# Patient Record
Sex: MALE | Race: WHITE | NOT HISPANIC OR LATINO | URBAN - METROPOLITAN AREA
[De-identification: names, ages, dates, MRNs, and addresses within clinical notes are randomized per-mention and may not be internally consistent; named-entity substitution may affect disease eponyms.]

---

## 2018-06-29 ENCOUNTER — OUTPATIENT (OUTPATIENT)
Dept: OUTPATIENT SERVICES | Facility: HOSPITAL | Age: 58
LOS: 1 days | End: 2018-06-29

## 2020-01-18 ENCOUNTER — APPOINTMENT (OUTPATIENT)
Dept: ULTRASOUND IMAGING | Facility: CLINIC | Age: 60
End: 2020-01-18
Payer: COMMERCIAL

## 2020-01-18 PROCEDURE — 93880 EXTRACRANIAL BILAT STUDY: CPT

## 2022-04-19 PROBLEM — Z00.00 ENCOUNTER FOR PREVENTIVE HEALTH EXAMINATION: Status: ACTIVE | Noted: 2022-04-19

## 2022-04-20 ENCOUNTER — APPOINTMENT (OUTPATIENT)
Dept: NEUROSURGERY | Facility: CLINIC | Age: 62
End: 2022-04-20
Payer: COMMERCIAL

## 2022-04-20 VITALS
WEIGHT: 250 LBS | BODY MASS INDEX: 33.86 KG/M2 | TEMPERATURE: 98 F | HEIGHT: 72 IN | DIASTOLIC BLOOD PRESSURE: 82 MMHG | SYSTOLIC BLOOD PRESSURE: 132 MMHG

## 2022-04-20 DIAGNOSIS — S32.009A UNSPECIFIED FRACTURE OF UNSPECIFIED LUMBAR VERTEBRA, INITIAL ENCOUNTER FOR CLOSED FRACTURE: ICD-10-CM

## 2022-04-20 PROCEDURE — 99203 OFFICE O/P NEW LOW 30 MIN: CPT

## 2022-04-20 RX ORDER — LOSARTAN POTASSIUM AND HYDROCHLOROTHIAZIDE 12.5; 1 MG/1; MG/1
100-12.5 TABLET ORAL
Refills: 0 | Status: ACTIVE | COMMUNITY

## 2022-04-20 RX ORDER — FELODIPINE 5 MG/1
5 TABLET, EXTENDED RELEASE ORAL
Refills: 0 | Status: ACTIVE | COMMUNITY

## 2022-04-20 RX ORDER — METHOCARBAMOL 500 MG/1
500 TABLET, FILM COATED ORAL TWICE DAILY
Qty: 14 | Refills: 0 | Status: ACTIVE | COMMUNITY
Start: 2022-04-20 | End: 1900-01-01

## 2022-04-20 RX ORDER — ROSUVASTATIN CALCIUM 5 MG/1
5 TABLET, FILM COATED ORAL
Refills: 0 | Status: ACTIVE | COMMUNITY

## 2022-04-20 RX ORDER — ATENOLOL 50 MG/1
50 TABLET ORAL
Refills: 0 | Status: ACTIVE | COMMUNITY

## 2022-04-20 RX ORDER — FENOFIBRIC ACID 135 MG/1
135 CAPSULE, DELAYED RELEASE ORAL
Refills: 0 | Status: ACTIVE | COMMUNITY

## 2022-04-20 RX ORDER — NAPROXEN 500 MG/1
500 TABLET ORAL
Qty: 60 | Refills: 1 | Status: ACTIVE | COMMUNITY
Start: 2022-04-20 | End: 1900-01-01

## 2022-04-20 NOTE — HISTORY OF PRESENT ILLNESS
[de-identified] : 61-year-old male presents to the neurosurgery office for an initial office visit with his wife with complaints of low back pain.  The patient reports that his symptoms have been present for about 1 week with no specific mechanism of injury.  The patient works as a  and does auto part deliveries.  He states that he can drive up to 190 miles a day and does a lot of heavy lifting.  Approximately 1 week ago, the patient experienced increased low back pain.  Initially went to a chiropractor but did not get any relief after treatment.  Because of the persistent pain and left lower extremity sciatica symptoms, the patient went to the local urgent care where radiographs were obtained and reviewed of the lumbar spine revealing an L4 compression fracture deformity of the inferior endplates.  He was given a steroid taper, muscle relaxant, and meloxicam which has helped with his current symptoms.  He is currently not working because of his present symptoms.  He has no other complaints at present.

## 2022-04-20 NOTE — DATA REVIEWED
[de-identified] : Were reviewed of the lumbar spine (disc returned to the patient)  -fracture of the inferior endplate of the L4 vertebral body

## 2022-04-20 NOTE — ASSESSMENT
[FreeTextEntry1] : Discussed the history, physical examination findings, and recent imaging with the patient and his wife with all questions answered.  Discussed that further evaluation is warranted with an MRI of the lumbar spine because the patient sciatic complaints to rule out retropulsion of fragments.  Recommend rest and modified activity into the next office visit.  The patient will continue to remain out of work until the next office visit.  LSO brace provided as well as medications (muscle relaxant/NSAIDs).  The patient will follow up after the imaging studies are completed to discuss further treatment recommendations.

## 2022-04-28 DIAGNOSIS — M62.830 MUSCLE SPASM OF BACK: ICD-10-CM

## 2022-04-28 RX ORDER — METHOCARBAMOL 500 MG/1
500 TABLET, FILM COATED ORAL TWICE DAILY
Qty: 14 | Refills: 0 | Status: ACTIVE | COMMUNITY
Start: 2022-04-28 | End: 1900-01-01

## 2022-05-03 ENCOUNTER — APPOINTMENT (OUTPATIENT)
Dept: MRI IMAGING | Facility: CLINIC | Age: 62
End: 2022-05-03
Payer: COMMERCIAL

## 2022-05-03 PROCEDURE — 72148 MRI LUMBAR SPINE W/O DYE: CPT

## 2022-05-05 ENCOUNTER — APPOINTMENT (OUTPATIENT)
Dept: NEUROSURGERY | Facility: CLINIC | Age: 62
End: 2022-05-05
Payer: COMMERCIAL

## 2022-05-05 VITALS
TEMPERATURE: 98.4 F | HEIGHT: 72 IN | SYSTOLIC BLOOD PRESSURE: 140 MMHG | DIASTOLIC BLOOD PRESSURE: 84 MMHG | WEIGHT: 250 LBS | BODY MASS INDEX: 33.86 KG/M2

## 2022-05-05 DIAGNOSIS — Z72.0 TOBACCO USE: ICD-10-CM

## 2022-05-05 DIAGNOSIS — M54.16 RADICULOPATHY, LUMBAR REGION: ICD-10-CM

## 2022-05-05 PROCEDURE — 99213 OFFICE O/P EST LOW 20 MIN: CPT

## 2022-05-05 RX ORDER — NAPROXEN 500 MG/1
500 TABLET ORAL
Qty: 60 | Refills: 1 | Status: ACTIVE | COMMUNITY
Start: 2022-05-05 | End: 1900-01-01

## 2022-05-05 NOTE — DATA REVIEWED
[de-identified] : MRI has been reviewed of the lumbar spine from 5/5/2022: L3-4 lumbar stenosis with posterior synovial cyst.  Multilevel spondylosis. [de-identified] : Were reviewed of the lumbar spine (disc returned to the patient)  -fracture of the inferior endplate of the L4 vertebral body

## 2022-05-05 NOTE — REASON FOR VISIT
[Follow-Up: _____] : a [unfilled] follow-up visit [FreeTextEntry1] : Pt here for MRI review. Pfizer booster on 01/02/22.

## 2022-05-05 NOTE — HISTORY OF PRESENT ILLNESS
[de-identified] : 61-year-old male presents to the neurosurgery office for follow-up evaluation of low back pain/lumbar radiculopathy.  The patient reports that his symptoms have been present for about 1 week with no specific mechanism of injury.  The patient works as a  and does auto part deliveries.  He states that he can drive up to 190 miles a day and does a lot of heavy lifting.  He was given a steroid taper, muscle relaxant, and meloxicam which has helped with his current symptoms.  He is currently not working because of his present symptoms.  He has no other complaints at present.  An MRI was ordered at the last office visit which the patient is here today to review.  He states that his symptoms have improved somewhat with and feels that he can return to work on Monday, 5/9/2022.

## 2022-05-05 NOTE — HISTORY OF PRESENT ILLNESS
[de-identified] : 61-year-old male presents to the neurosurgery office for follow-up evaluation of low back pain/lumbar radiculopathy.  The patient reports that his symptoms have been present for about 1 week with no specific mechanism of injury.  The patient works as a  and does auto part deliveries.  He states that he can drive up to 190 miles a day and does a lot of heavy lifting.  He was given a steroid taper, muscle relaxant, and meloxicam which has helped with his current symptoms.  He is currently not working because of his present symptoms.  He has no other complaints at present.  An MRI was ordered at the last office visit which the patient is here today to review.  He states that his symptoms have improved somewhat with and feels that he can return to work on Monday, 5/9/2022.

## 2022-05-05 NOTE — DATA REVIEWED
[de-identified] : MRI has been reviewed of the lumbar spine from 5/5/2022: L3-4 lumbar stenosis with posterior synovial cyst.  Multilevel spondylosis. [de-identified] : Were reviewed of the lumbar spine (disc returned to the patient)  -fracture of the inferior endplate of the L4 vertebral body

## 2022-05-05 NOTE — ASSESSMENT
[FreeTextEntry1] : Discussed the history, physical examination findings, and recent imaging with the patient and his wife with all questions answered.  The MRI is negative for any acute fracture and no bone marrow edema noted in the imaging.  Lumbar stenosis is seen and most pronounced at L3-4.  Conservative treatment recommended with rest and modified activity along with anti-inflammatory medications.  A note has been provided for the patient to return to work on 5/9/2022.  Physical therapy and pain management referrals given.  Discussed that there is no role for neurosurgical intervention.  The patient will follow up on an as-needed basis.

## 2022-05-18 ENCOUNTER — APPOINTMENT (OUTPATIENT)
Age: 62
End: 2022-05-18
Payer: COMMERCIAL

## 2022-05-18 PROCEDURE — 72110 X-RAY EXAM L-2 SPINE 4/>VWS: CPT

## 2022-06-21 ENCOUNTER — APPOINTMENT (OUTPATIENT)
Dept: NEUROSURGERY | Facility: CLINIC | Age: 62
End: 2022-06-21
Payer: COMMERCIAL

## 2022-06-21 VITALS
BODY MASS INDEX: 33.86 KG/M2 | WEIGHT: 250 LBS | HEART RATE: 60 BPM | TEMPERATURE: 97.7 F | DIASTOLIC BLOOD PRESSURE: 92 MMHG | HEIGHT: 72 IN | SYSTOLIC BLOOD PRESSURE: 172 MMHG

## 2022-06-21 DIAGNOSIS — M48.061 SPINAL STENOSIS, LUMBAR REGION WITHOUT NEUROGENIC CLAUDICATION: ICD-10-CM

## 2022-06-21 DIAGNOSIS — M54.30 DORSALGIA, UNSPECIFIED: ICD-10-CM

## 2022-06-21 DIAGNOSIS — M54.9 DORSALGIA, UNSPECIFIED: ICD-10-CM

## 2022-06-21 PROCEDURE — 99213 OFFICE O/P EST LOW 20 MIN: CPT

## 2022-06-21 NOTE — REASON FOR VISIT
[Follow-Up: _____] : a [unfilled] follow-up visit [FreeTextEntry1] : Pt was referred from Dr. Hughes for a F/U.

## 2022-06-21 NOTE — ASSESSMENT
[FreeTextEntry1] : Discussed the history, physical examination findings, and recent imaging with the patient and his wife with all questions answered.  The MRI is negative for any acute fracture and no bone marrow edema noted in the imaging.  Lumbar stenosis is seen and most pronounced at L3-4.  The patient received an injection from the pain management doctor (Dr. Hughes) and states that he is doing well.  Dynamic spondylolisthesis at L2-3 is noted, however the patient has not affected by it.  Discussed that there is no role for acute neurosurgical intervention at this time.  Continue with pain management.  The patient may follow-up with the neurosurgery office if pain management does not provide good symptom relief.

## 2022-06-21 NOTE — HISTORY OF PRESENT ILLNESS
[de-identified] : 61-year-old male presents to the neurosurgery office for follow-up evaluation of low back pain/lumbar radiculopathy.  The patient reports that his symptoms have been present for about 1 week with no specific mechanism of injury.  The patient works as a  and does auto part deliveries.  He states that he can drive up to 190 miles a day and does a lot of heavy lifting.  He was given a steroid taper, muscle relaxant, and meloxicam which has helped with his current symptoms.  He is currently not working because of his present symptoms.  He has no other complaints at present.  He states that his symptoms have improved somewhat with and feels that he can return to work on Monday, 5/9/2022.  An MRI of the lumbar spine was reviewed at the last office visit showing L3-4 stenosis.  Conservative treatment was recommended with PT and pain management.  The patient went to Dr. Hughes who ordered flexion/extension views.  Dynamic instability was noted at L2-3 and the patient was then directed to follow-up with us for review of the imaging.  The patient states that he has great symptom relief since the injection that he received from Dr. Hughes.  The patient states that he is doing well since the injection and has no new complaints.

## 2022-06-21 NOTE — DATA REVIEWED
[de-identified] : MRI has been reviewed of the lumbar spine from 5/5/2022: L3-4 lumbar stenosis with posterior synovial cyst.  Multilevel spondylosis. [de-identified] : Were reviewed of the lumbar spine with flexion/extension views (5/18/2022) dynamic instability noted at L2-3 on flexion-extension views; no acute fracture/dislocation

## 2022-06-21 NOTE — HISTORY OF PRESENT ILLNESS
[de-identified] : 61-year-old male presents to the neurosurgery office for follow-up evaluation of low back pain/lumbar radiculopathy.  The patient reports that his symptoms have been present for about 1 week with no specific mechanism of injury.  The patient works as a  and does auto part deliveries.  He states that he can drive up to 190 miles a day and does a lot of heavy lifting.  He was given a steroid taper, muscle relaxant, and meloxicam which has helped with his current symptoms.  He is currently not working because of his present symptoms.  He has no other complaints at present.  He states that his symptoms have improved somewhat with and feels that he can return to work on Monday, 5/9/2022.  An MRI of the lumbar spine was reviewed at the last office visit showing L3-4 stenosis.  Conservative treatment was recommended with PT and pain management.  The patient went to Dr. Hughes who ordered flexion/extension views.  Dynamic instability was noted at L2-3 and the patient was then directed to follow-up with us for review of the imaging.  The patient states that he has great symptom relief since the injection that he received from Dr. Hughes.  The patient states that he is doing well since the injection and has no new complaints.

## 2024-11-11 NOTE — DATA REVIEWED
[de-identified] : MRI has been reviewed of the lumbar spine from 5/5/2022: L3-4 lumbar stenosis with posterior synovial cyst.  Multilevel spondylosis. [de-identified] : Were reviewed of the lumbar spine with flexion/extension views (5/18/2022) dynamic instability noted at L2-3 on flexion-extension views; no acute fracture/dislocation at least 3/5 throughout

## 2024-11-25 NOTE — PHYSICAL EXAM
Functional Status?: 0 (fully active) Relevant Functional Limitations: None. Additional History: No known allergies. Patient does not take any prescription medications. [General Appearance - Alert] : alert [General Appearance - In No Acute Distress] : in no acute distress [General Appearance - Well Nourished] : well nourished [General Appearance - Well Developed] : well developed [General Appearance - Well-Appearing] : healthy appearing [] : normal voice and communication [Oriented To Time, Place, And Person] : oriented to person, place, and time [Impaired Insight] : insight and judgment were intact [Affect] : the affect was normal [Mood] : the mood was normal [Memory Recent] : recent memory was not impaired [Memory Remote] : remote memory was not impaired [Person] : oriented to person [Place] : oriented to place [Time] : oriented to time [Motor Tone] : muscle tone was normal in all four extremities [Motor Strength] : muscle strength was normal in all four extremities [Involuntary Movements] : no involuntary movements were seen [No Muscle Atrophy] : normal bulk in all four extremities [5] : S1 ankle flexors 5/5 [Abnormal Walk] : normal gait [FreeTextEntry8] : Patient ambulates unassisted [Straight-Leg Raise Test - Left] : straight leg raise of the left leg was negative [Straight-Leg Raise Test - Right] : straight leg raise  of the right leg was negative